# Patient Record
Sex: FEMALE | Race: WHITE
[De-identification: names, ages, dates, MRNs, and addresses within clinical notes are randomized per-mention and may not be internally consistent; named-entity substitution may affect disease eponyms.]

---

## 2019-08-05 ENCOUNTER — HOSPITAL ENCOUNTER (EMERGENCY)
Dept: HOSPITAL 61 - ER | Age: 69
Discharge: HOME | End: 2019-08-05
Payer: COMMERCIAL

## 2019-08-05 VITALS — BODY MASS INDEX: 42.33 KG/M2 | WEIGHT: 230 LBS | HEIGHT: 62 IN

## 2019-08-05 VITALS — SYSTOLIC BLOOD PRESSURE: 145 MMHG | DIASTOLIC BLOOD PRESSURE: 65 MMHG
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
## 2019-08-05 DIAGNOSIS — S52.502A: Primary | ICD-10-CM
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
## 2019-08-05 DIAGNOSIS — Z88.8: ICD-10-CM

## 2019-08-05 PROCEDURE — 73110 X-RAY EXAM OF WRIST: CPT

## 2019-08-05 PROCEDURE — 99284 EMERGENCY DEPT VISIT MOD MDM: CPT

## 2019-08-05 PROCEDURE — 29125 APPL SHORT ARM SPLINT STATIC: CPT

## 2019-08-05 NOTE — RAD
EXAM: Left wrist, 3 views.

 

HISTORY: Fall. Pain.

 

COMPARISON: None.

 

FINDINGS: 3 views of the left wrist are obtained. There is a comminuted 

intra-articular fracture of the distal radial metaphysis. There is 

widening of the scapholunate joint space. There is first carpometacarpal 

joint space narrowing with subchondral sclerosis and marginal spurring. 

There is also spurring involving the first interphalangeal joint. There is

wrist soft tissue swelling.

 

IMPRESSION: 

1. Comminuted intraarticular fracture of the distal radial metaphysis.

2. Moderate first carpometacarpal and mild first interphalangeal joint 

osteoarthritis.

3. Mild widening of the scapholunate joint space. This can be seen with a 

scapholunate ligament injury.

 

Electronically signed by: Daphnie Stock MD (8/5/2019 8:27 AM) [facility]

## 2019-08-05 NOTE — PHYS DOC
Adult General


Chief Complaint


Chief Complaint:  WRIST PAIN





HPI


HPI





Patient is a 69  year old female who presents to the ER with complaints of left 

wrist pain after tripping and falling in her driveway this morning. Pt states 

she fell forward onto her hands. She denies any right wrist or hand pain. She 

denies any head, neck, or back pain. Currently, she rates her pain a 7/10 on the

pain scale. She denies any LOC, nausea, or vomiting.





Review of Systems


Review of Systems





Constitutional: Denies fever or chills []


Eyes: Denies change in visual acuity, redness, or eye pain []


HENT: Denies nasal congestion or sore throat []


Respiratory: Denies cough or shortness of breath []


Cardiovascular: No additional information not addressed in HPI []


Musculoskeletal: Denies back pain, see HPI


Integument: Denies rash or skin lesions []


Neurologic: Denies headache, focal weakness or sensory changes []








Complete systems were reviewed and found to be within normal limits, except as 

documented in this note.





Current Medications


Current Medications





Current Medications








 Medications


  (Trade)  Dose


 Ordered  Sig/Hemal  Start Time


 Stop Time Status Last Admin


Dose Admin


 


 Acetaminophen/


 Hydrocodone Bitart


  (Lortab 5/325)  1 tab  1X  ONCE  8/5/19 08:45


 8/5/19 08:46   














Allergies


Allergies





Allergies








Coded Allergies Type Severity Reaction Last Updated Verified


 


  ACE Inhibitors Allergy Unknown  8/5/19 Yes


 


  cyclobenzaprine Allergy Unknown  8/5/19 Yes











Physical Exam


Physical Exam





Constitutional: Well developed, well nourished, no acute distress, non-toxic 

appearance, obese. []


HENT: Normocephalic, atraumatic, bilateral external ears normal, oropharynx 

moist, no oral exudates, nose normal. []


Eyes: conjunctiva normal, no discharge. [] 


Neck: Normal range of motion, no tenderness, no stridor. [] 


Cardiovascular:Heart rate regular rhythm


Lungs & Thorax: Respirations even and unlabored, no retractions, no respiratory 

distress


Skin: Warm, dry, no erythema, no rash. [] 


Back: No tenderness


Extremities: No cyanosis, no clubbing; medial L wrist TTP, 1+ edema L wrist, 

limited ROM L wrist due to pain intolerance, no obvious deformity


Neurologic: Alert and oriented X 3, no focal deficits noted. []


Psychologic: Affect normal, judgement normal, mood normal. []





EKG


EKG


[]





Radiology/Procedures


Radiology/Procedures


PROCEDURE: WRIST 3V LEFT





EXAM: Left wrist, 3 views.


 


HISTORY: Fall. Pain.


 


COMPARISON: None.


 


FINDINGS: 3 views of the left wrist are obtained. There is a comminuted 


intra-articular fracture of the distal radial metaphysis. There is 


widening of the scapholunate joint space. There is first carpometacarpal 


joint space narrowing with subchondral sclerosis and marginal spurring. 


There is also spurring involving the first interphalangeal joint. There is


wrist soft tissue swelling.


 


IMPRESSION: 


1. Comminuted intraarticular fracture of the distal radial metaphysis.


2. Moderate first carpometacarpal and mild first interphalangeal joint 


osteoarthritis.


3. Mild widening of the scapholunate joint space. This can be seen with a 


scapholunate ligament injury.[]





Course & Med Decision Making


Course & Med Decision Making


Pertinent Labs and Imaging studies reviewed. (See chart for details)


dx: L distal radius fx, scapholunate ligament injury 


Pt presents for L wrist pain after fall. She was given a hydrocodone for pain 

relief. X-ray revealed a comminuted dx of the distal radius and scapholunate 

widening indicating an injury of the scapholunate ligament.


0840- Spoke with Dr. Hoff will have patient follow up with Dr. Kristina Velez 

with [facility]. 


Kristina Sauceda,


[address] [facility] [address], [place] [address]


Phone: [phone]


Pt was placed in a sugartong splint and a sling. Prescription written for Norco 

5/325 mg tablets, 1 PO q6h prn pain, RICE, Call Dr. Sauceda today to schedule 

follow up appointment. 


Patient verbalized an understanding of home care, medications, follow-up, and 

return to ED instructions and was in agreement with the plan of care.





Dragon Disclaimer


Soren Disclaimer


This electronic medical record was generated, in whole or in part, using a voice

 recognition dictation system.





Departure


Departure


Impression:  


   Primary Impression:  


   Closed fracture of left distal radius


   Additional Impression:  


   Scapholunate ligament injury, no instability


Disposition:  01 HOME, SELF-CARE


Condition:  STABLE


Patient Instructions:  Wrist Fracture, Easy-to-Read





Additional Instructions:  


Fill prescription(s) and use as directed, be sure to drink plenty of water and 

take a stool softener twice daily while taking hydrocodone. Recommend 

application of ice, elevation, and rest of affected extremity. Wear the splint 

and sling that was placed until follow up appointment with Dr. Sauceda, call 

her office today to schedule an appointment. Return to the ER if your symptoms 

worsen.  





Kristina Sauceda,


[address], [place] [address]


Phone: [phone]


Scripts


Hydrocodone Bit/Acetaminophen (HYDROCODONE-APAP 5-325  **) 1 Tab Tablet


1 TAB PO PRN Q6HRS PRN for PAIN for 3 Days, #12 TAB 0 Refills


   Prov: ABDOUL HU         8/5/19





Splinting


Splinting :  


   Location:  L wrist


   Hand-Made Type:  orthoglass


   Splint:  sugar-tong


   Pre-Proc Neuro Vasc Exam:  normal


   Post-Proc Neuro Vasc Exam:  normal, unchanged from pre-exam


Progress


Pt tolerated procedure well, no complications.





Problem Qualifiers








   Primary Impression:  


   Closed fracture of left distal radius


   Encounter type:  initial encounter  Fracture morphology:  unspecified 

   fracture morphology  Qualified Codes:  S52.502A - Unspecified fracture of the

    lower end of left radius, initial encounter for closed fracture


   Additional Impression:  


   Scapholunate ligament injury, no instability


   Encounter type:  initial encounter  Laterality:  left  Qualified Codes:  

   S69.92XA - Unspecified injury of left wrist, hand and finger(s), initial 

   encounter








ABDOUL HU APRN        Aug 5, 2019 08:16